# Patient Record
Sex: FEMALE | Race: WHITE | NOT HISPANIC OR LATINO | Employment: UNEMPLOYED | URBAN - METROPOLITAN AREA
[De-identification: names, ages, dates, MRNs, and addresses within clinical notes are randomized per-mention and may not be internally consistent; named-entity substitution may affect disease eponyms.]

---
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
## 2021-10-01 PROBLEM — Z04.89 ENCOUNTER FOR EXAMINATION AND OBSERVATION FOR OTHER SPECIFIED REASONS: Status: RESOLVED | Noted: 2017-04-03 | Resolved: 2017-07-14

## 2025-07-15 NOTE — PROGRESS NOTES
Complaints today: ready for baby    /68   Wt 75.6 kg (166 lb 10.7 oz)   LMP 2016 (Approximate)     17 y.o., at 39w3d by Estimated Date of Delivery: 17  Patient Active Problem List   Diagnosis    Chlamydia infection affecting pregnancy in first trimester, antepartum    Supervision of normal first teen pregnancy in third trimester    Rubella non-immune status, antepartum    Right flank pain    Lower abdominal pain    Pyelonephritis affecting pregnancy in second trimester    Attention deficit disorder    Back pain affecting pregnancy in third trimester     OB History    Para Term  AB Living   1             SAB TAB Ectopic Multiple Live Births                  # Outcome Date GA Lbr Yahir/2nd Weight Sex Delivery Anes PTL Lv   1 Current                   Dating reviewed    Allergies and problem list reviewed and updated    Medical and surgical history reviewed    Prenatal labs reviewed and updated    Physical Exam:  ABD: soft, gravid, nontender    Assessment:  Normal teen pregnancy    Plan:   Will plan for IOL at 41 weeks   follow up 1 Weeks, kick counts, labor precautions       Previously Negative (within the last year)